# Patient Record
Sex: FEMALE | Race: OTHER | NOT HISPANIC OR LATINO | URBAN - METROPOLITAN AREA
[De-identification: names, ages, dates, MRNs, and addresses within clinical notes are randomized per-mention and may not be internally consistent; named-entity substitution may affect disease eponyms.]

---

## 2019-04-11 ENCOUNTER — EMERGENCY (EMERGENCY)
Facility: HOSPITAL | Age: 24
LOS: 1 days | Discharge: HOME | End: 2019-04-11
Attending: EMERGENCY MEDICINE | Admitting: EMERGENCY MEDICINE
Payer: COMMERCIAL

## 2019-04-11 VITALS
DIASTOLIC BLOOD PRESSURE: 78 MMHG | WEIGHT: 85.1 LBS | HEART RATE: 884 BPM | SYSTOLIC BLOOD PRESSURE: 117 MMHG | HEIGHT: 60 IN | RESPIRATION RATE: 18 BRPM | TEMPERATURE: 97 F | OXYGEN SATURATION: 100 %

## 2019-04-11 VITALS
TEMPERATURE: 98 F | SYSTOLIC BLOOD PRESSURE: 95 MMHG | DIASTOLIC BLOOD PRESSURE: 61 MMHG | OXYGEN SATURATION: 100 % | RESPIRATION RATE: 18 BRPM | HEART RATE: 70 BPM

## 2019-04-11 PROBLEM — Z00.00 ENCOUNTER FOR PREVENTIVE HEALTH EXAMINATION: Status: ACTIVE | Noted: 2019-04-11

## 2019-04-11 LAB
ALBUMIN SERPL ELPH-MCNC: 5.2 G/DL — HIGH (ref 3.3–5)
ALP SERPL-CCNC: 35 U/L — LOW (ref 40–120)
ALT FLD-CCNC: 13 U/L — SIGNIFICANT CHANGE UP (ref 10–45)
ANION GAP SERPL CALC-SCNC: 12 MMOL/L — SIGNIFICANT CHANGE UP (ref 5–17)
APPEARANCE UR: CLEAR — SIGNIFICANT CHANGE UP
APTT BLD: 29.3 SEC — SIGNIFICANT CHANGE UP (ref 27.5–36.3)
AST SERPL-CCNC: 18 U/L — SIGNIFICANT CHANGE UP (ref 10–40)
BASOPHILS # BLD AUTO: 0.04 K/UL — SIGNIFICANT CHANGE UP (ref 0–0.2)
BASOPHILS NFR BLD AUTO: 0.7 % — SIGNIFICANT CHANGE UP (ref 0–2)
BILIRUB SERPL-MCNC: 0.3 MG/DL — SIGNIFICANT CHANGE UP (ref 0.2–1.2)
BILIRUB UR-MCNC: NEGATIVE — SIGNIFICANT CHANGE UP
BLD GP AB SCN SERPL QL: NEGATIVE — SIGNIFICANT CHANGE UP
BUN SERPL-MCNC: 12 MG/DL — SIGNIFICANT CHANGE UP (ref 7–23)
CALCIUM SERPL-MCNC: 9.7 MG/DL — SIGNIFICANT CHANGE UP (ref 8.4–10.5)
CHLORIDE SERPL-SCNC: 99 MMOL/L — SIGNIFICANT CHANGE UP (ref 96–108)
CO2 SERPL-SCNC: 23 MMOL/L — SIGNIFICANT CHANGE UP (ref 22–31)
COLOR SPEC: YELLOW — SIGNIFICANT CHANGE UP
CREAT SERPL-MCNC: 0.7 MG/DL — SIGNIFICANT CHANGE UP (ref 0.5–1.3)
DIFF PNL FLD: ABNORMAL
EOSINOPHIL # BLD AUTO: 0.09 K/UL — SIGNIFICANT CHANGE UP (ref 0–0.5)
EOSINOPHIL NFR BLD AUTO: 1.5 % — SIGNIFICANT CHANGE UP (ref 0–6)
GLUCOSE SERPL-MCNC: 99 MG/DL — SIGNIFICANT CHANGE UP (ref 70–99)
GLUCOSE UR QL: NEGATIVE — SIGNIFICANT CHANGE UP
HCG SERPL-ACNC: 37 MIU/ML — HIGH
HCG UR QL: POSITIVE — SIGNIFICANT CHANGE UP
HCT VFR BLD CALC: 38.6 % — SIGNIFICANT CHANGE UP (ref 34.5–45)
HGB BLD-MCNC: 12.9 G/DL — SIGNIFICANT CHANGE UP (ref 11.5–15.5)
IMM GRANULOCYTES NFR BLD AUTO: 0.2 % — SIGNIFICANT CHANGE UP (ref 0–1.5)
INR BLD: 1.03 — SIGNIFICANT CHANGE UP (ref 0.88–1.16)
KETONES UR-MCNC: NEGATIVE — SIGNIFICANT CHANGE UP
LEUKOCYTE ESTERASE UR-ACNC: NEGATIVE — SIGNIFICANT CHANGE UP
LIDOCAIN IGE QN: 40 U/L — SIGNIFICANT CHANGE UP (ref 7–60)
LYMPHOCYTES # BLD AUTO: 2.5 K/UL — SIGNIFICANT CHANGE UP (ref 1–3.3)
LYMPHOCYTES # BLD AUTO: 42.7 % — SIGNIFICANT CHANGE UP (ref 13–44)
MAGNESIUM SERPL-MCNC: 2.2 MG/DL — SIGNIFICANT CHANGE UP (ref 1.6–2.6)
MCHC RBC-ENTMCNC: 30.9 PG — SIGNIFICANT CHANGE UP (ref 27–34)
MCHC RBC-ENTMCNC: 33.4 GM/DL — SIGNIFICANT CHANGE UP (ref 32–36)
MCV RBC AUTO: 92.3 FL — SIGNIFICANT CHANGE UP (ref 80–100)
MONOCYTES # BLD AUTO: 0.51 K/UL — SIGNIFICANT CHANGE UP (ref 0–0.9)
MONOCYTES NFR BLD AUTO: 8.7 % — SIGNIFICANT CHANGE UP (ref 2–14)
NEUTROPHILS # BLD AUTO: 2.71 K/UL — SIGNIFICANT CHANGE UP (ref 1.8–7.4)
NEUTROPHILS NFR BLD AUTO: 46.2 % — SIGNIFICANT CHANGE UP (ref 43–77)
NITRITE UR-MCNC: NEGATIVE — SIGNIFICANT CHANGE UP
NRBC # BLD: 0 /100 WBCS — SIGNIFICANT CHANGE UP (ref 0–0)
PH UR: 6 — SIGNIFICANT CHANGE UP (ref 5–8)
PLATELET # BLD AUTO: 295 K/UL — SIGNIFICANT CHANGE UP (ref 150–400)
POTASSIUM SERPL-MCNC: 3.9 MMOL/L — SIGNIFICANT CHANGE UP (ref 3.5–5.3)
POTASSIUM SERPL-SCNC: 3.9 MMOL/L — SIGNIFICANT CHANGE UP (ref 3.5–5.3)
PROT SERPL-MCNC: 7.7 G/DL — SIGNIFICANT CHANGE UP (ref 6–8.3)
PROT UR-MCNC: NEGATIVE MG/DL — SIGNIFICANT CHANGE UP
PROTHROM AB SERPL-ACNC: 11.7 SEC — SIGNIFICANT CHANGE UP (ref 10–12.9)
RBC # BLD: 4.18 M/UL — SIGNIFICANT CHANGE UP (ref 3.8–5.2)
RBC # FLD: 12.1 % — SIGNIFICANT CHANGE UP (ref 10.3–14.5)
RH IG SCN BLD-IMP: POSITIVE — SIGNIFICANT CHANGE UP
SODIUM SERPL-SCNC: 134 MMOL/L — LOW (ref 135–145)
SP GR SPEC: <=1.005 — SIGNIFICANT CHANGE UP (ref 1–1.03)
UROBILINOGEN FLD QL: 0.2 E.U./DL — SIGNIFICANT CHANGE UP
WBC # BLD: 5.86 K/UL — SIGNIFICANT CHANGE UP (ref 3.8–10.5)
WBC # FLD AUTO: 5.86 K/UL — SIGNIFICANT CHANGE UP (ref 3.8–10.5)

## 2019-04-11 PROCEDURE — 76817 TRANSVAGINAL US OBSTETRIC: CPT

## 2019-04-11 PROCEDURE — 85025 COMPLETE CBC W/AUTO DIFF WBC: CPT

## 2019-04-11 PROCEDURE — 86900 BLOOD TYPING SEROLOGIC ABO: CPT

## 2019-04-11 PROCEDURE — 86901 BLOOD TYPING SEROLOGIC RH(D): CPT

## 2019-04-11 PROCEDURE — 85730 THROMBOPLASTIN TIME PARTIAL: CPT

## 2019-04-11 PROCEDURE — 99284 EMERGENCY DEPT VISIT MOD MDM: CPT | Mod: 25

## 2019-04-11 PROCEDURE — 84702 CHORIONIC GONADOTROPIN TEST: CPT

## 2019-04-11 PROCEDURE — 86850 RBC ANTIBODY SCREEN: CPT

## 2019-04-11 PROCEDURE — 36415 COLL VENOUS BLD VENIPUNCTURE: CPT

## 2019-04-11 PROCEDURE — 83735 ASSAY OF MAGNESIUM: CPT

## 2019-04-11 PROCEDURE — 85610 PROTHROMBIN TIME: CPT

## 2019-04-11 PROCEDURE — 81001 URINALYSIS AUTO W/SCOPE: CPT

## 2019-04-11 PROCEDURE — 99284 EMERGENCY DEPT VISIT MOD MDM: CPT

## 2019-04-11 PROCEDURE — 76817 TRANSVAGINAL US OBSTETRIC: CPT | Mod: 26

## 2019-04-11 PROCEDURE — 83690 ASSAY OF LIPASE: CPT

## 2019-04-11 PROCEDURE — 80053 COMPREHEN METABOLIC PANEL: CPT

## 2019-04-11 PROCEDURE — 81025 URINE PREGNANCY TEST: CPT

## 2019-04-11 RX ORDER — SODIUM CHLORIDE 9 MG/ML
1000 INJECTION INTRAMUSCULAR; INTRAVENOUS; SUBCUTANEOUS ONCE
Qty: 0 | Refills: 0 | Status: COMPLETED | OUTPATIENT
Start: 2019-04-11 | End: 2019-04-11

## 2019-04-11 RX ADMIN — SODIUM CHLORIDE 1000 MILLILITER(S): 9 INJECTION INTRAMUSCULAR; INTRAVENOUS; SUBCUTANEOUS at 01:47

## 2019-04-11 NOTE — ED PROVIDER NOTE - CARE PLAN
Principal Discharge DX:	Pregnancy complication Principal Discharge DX:	Pregnancy complication  Secondary Diagnosis:	Threatened

## 2019-04-11 NOTE — ED ADULT NURSE NOTE - NSIMPLEMENTINTERV_GEN_ALL_ED
Implemented All Universal Safety Interventions:  [place] to call system. Call bell, personal items and telephone within reach. Instruct patient to call for assistance. Room bathroom lighting operational. Non-slip footwear when patient is off stretcher. Physically safe environment: no spills, clutter or unnecessary equipment. Stretcher in lowest position, wheels locked, appropriate side rails in place.

## 2019-04-11 NOTE — ED ADULT NURSE REASSESSMENT NOTE - NS ED NURSE REASSESS COMMENT FT1
Covering for Rn on break, Chidi RN (female) present as well as patients spouse during pelvic exam w/ LIMA Chaudhary.

## 2019-04-11 NOTE — ED PROVIDER NOTE - OBJECTIVE STATEMENT
LMP 1 month ago ~ 3/6 with + " light" line pregnancy at home  with current pregnancy -> states several days light bloody / brownish d/c and thus to ED for care Denies abd/ pelvic pain

## 2019-04-11 NOTE — ED PROVIDER NOTE - PHYSICAL EXAMINATION
pelvic notes os closed no lesions no FB Scant dried blood in vault no active bleeding No CMT or adnexal  tenderness

## 2019-04-11 NOTE — ED PROVIDER NOTE - CLINICAL SUMMARY MEDICAL DECISION MAKING FREE TEXT BOX
discussed all results finding and gyn follow up plan for this early pregnancy finding discussed all results finding and gyn follow up plan for this early pregnancy finding of low beta Hcg ( RH + ) -> follow up this weekend for repeat Beta HCG

## 2019-04-11 NOTE — ED PROVIDER NOTE - ATTENDING CONTRIBUTION TO CARE
23F , LMP 1 month ago.  states some blood/brown discharge over past few days.  no abd pain. no fevers. no vomiting.  gen- nad  heent- ncat, clear conj  cv -rrr  lungs -ctab  abd - soft, nt, nd  ext -wwp, no edema  neuro -aox3, steady gait, santa  no IUP seen on US, however very low HCG, recommend GYN f/u

## 2019-04-11 NOTE — ED ADULT TRIAGE NOTE - CHIEF COMPLAINT QUOTE
possible 3 weeks pregnant with vaginal bleeding started with light brown stain Saturday last week, now with clots and red color LMP 03/06/19, this is first pregnancy of the pt.

## 2019-04-15 DIAGNOSIS — O20.0 THREATENED ABORTION: ICD-10-CM

## 2019-04-15 DIAGNOSIS — Z3A.01 LESS THAN 8 WEEKS GESTATION OF PREGNANCY: ICD-10-CM

## 2019-04-15 DIAGNOSIS — N89.8 OTHER SPECIFIED NONINFLAMMATORY DISORDERS OF VAGINA: ICD-10-CM

## 2019-04-15 DIAGNOSIS — O20.9 HEMORRHAGE IN EARLY PREGNANCY, UNSPECIFIED: ICD-10-CM

## 2019-06-16 ENCOUNTER — EMERGENCY (EMERGENCY)
Facility: HOSPITAL | Age: 24
LOS: 1 days | Discharge: HOME | End: 2019-06-16
Admitting: EMERGENCY MEDICINE
Payer: COMMERCIAL

## 2019-06-16 VITALS
SYSTOLIC BLOOD PRESSURE: 103 MMHG | TEMPERATURE: 98 F | HEART RATE: 96 BPM | RESPIRATION RATE: 18 BRPM | OXYGEN SATURATION: 99 % | DIASTOLIC BLOOD PRESSURE: 67 MMHG

## 2019-06-16 PROCEDURE — 99284 EMERGENCY DEPT VISIT MOD MDM: CPT | Mod: 25

## 2019-06-16 PROCEDURE — 73562 X-RAY EXAM OF KNEE 3: CPT

## 2019-06-16 PROCEDURE — 27530 TREAT KNEE FRACTURE: CPT | Mod: RT

## 2019-06-16 PROCEDURE — 73562 X-RAY EXAM OF KNEE 3: CPT | Mod: 26

## 2019-06-16 PROCEDURE — 73700 CT LOWER EXTREMITY W/O DYE: CPT

## 2019-06-16 PROCEDURE — 73700 CT LOWER EXTREMITY W/O DYE: CPT | Mod: 26,RT

## 2019-06-16 PROCEDURE — 73562 X-RAY EXAM OF KNEE 3: CPT | Mod: 26,RT

## 2019-06-16 RX ORDER — IBUPROFEN 200 MG
600 TABLET ORAL ONCE
Refills: 0 | Status: COMPLETED | OUTPATIENT
Start: 2019-06-16 | End: 2019-06-16

## 2019-06-16 RX ADMIN — Medication 600 MILLIGRAM(S): at 21:30

## 2019-06-16 NOTE — CONSULT NOTE ADULT - SUBJECTIVE AND OBJECTIVE BOX
Orthopaedic Surgery Consult Note    For Surgeon: Kapil     HPI:  23yFemale otherwise healthy with R knee pain after direct medial collision during soccer agme. Pt unable to bear weight after, c/o of medial knee pain. No head trauma or LOC. No other injury. Denies numbness or tingling. No [facility].   Patient is a 23y old  Female who presents with a chief complaint of   HPI:  24 yo F with no pmh c/o R knee pain after getting hit while playing soccer. Pt collided with another played and was hit on the medial aspect of her R knee. Pt then fell to the ground. Pt unable to bear weight since. Pain worse with trying to straighten her knee. Denies hearing a pop. Admits to swelling. Denies numbness, tingling.    Allergies    No Known Allergies    Intolerances      PAST MEDICAL & SURGICAL HISTORY:    MEDICATIONS  (STANDING):    MEDICATIONS  (PRN):      Vital Signs Last 24 Hrs  T(C): 36.7 (16 Jun 2019 21:13), Max: 36.7 (16 Jun 2019 21:13)  T(F): 98 (16 Jun 2019 21:13), Max: 98 (16 Jun 2019 21:13)  HR: 96 (16 Jun 2019 21:13) (96 - 96)  BP: 103/67 (16 Jun 2019 21:13) (103/67 - 103/67)  BP(mean): --  RR: 18 (16 Jun 2019 21:13) (18 - 18)  SpO2: 99% (16 Jun 2019 21:13) (99% - 99%)    Physical Exam:    General: Pt Alert and oriented, NAD  R knee +effusion, no open wounds or ecchymosis  Pulses: 2+ DP/PT   Sensation: SILT s/s/sp/dp/t  Motor: EHL/FHL/TA/GS   Pt able to range knee 0 - 90 with mild difficulty 2/2 to effusion  Tenderness with varus stress on medial side  Mild medial joint line tenderness  Unable to perform McMurrays or Pivot shift 2/2 to pain  1+ Lachmans compared to contralateral side  Compartments soft and compressible        Imaging:   XR/CT: minimally displaced medial tibial plateua fracture     A/P: 23yFemale w minimally displaced medial tibial plateua fracture, concerning for concomittant soft tissue injury, r/o MCL, PCL, ACL. Compartments soft and compressible.     No acute orthopedic intervention  Pt placed in ACE compression, Knee immbilizer and crutches  NWB RLE   Encouraged rest, elevation, ice, compression   Discussed signs and symptoms of compartment syndrome and if evident, need to return to ED. Pt verbalized undestanding   Follow up with Dr. Dick this week for further followup and likely MRI   Discussed with Dr. Dick

## 2019-06-16 NOTE — ED ADULT NURSE NOTE - NSIMPLEMENTINTERV_GEN_ALL_ED
Implemented All Universal Safety Interventions:  [place] to call system. Call bell, personal items and telephone within reach. Instruct patient to call for assistance. Room bathroom lighting operational. Non-slip footwear when patient is off stretcher. Physically safe environment: no spills, clutter or unnecessary equipment. Stretcher in lowest position, wheels locked, appropriate side rails in place.

## 2019-06-16 NOTE — ED PROVIDER NOTE - NSFOLLOWUPINSTRUCTIONS_ED_ALL_ED_FT
You were evaluated in the ED for knee pain. You had a Xray and CT of the knee showing a medial tibial plateau fracture. You were evaluated by orthopedics. You were placed in a knee immobilizer and given crutches. DO NOT BEAR ANY WEIGHT ON THE AFFECTED LEG. USE THE CRUTCHES AT ALL TIMES. IF YOU DEVELOP SWELLING OF THE LOWER LEG, BLUE OR PALE DISCOLORATION OF THE LEG / FOOT, NUMBNESS /TINGLING OF THE TOES, OR INABILITY TO MOVE THEM, RETURN IMMEDIATELY TO THE ED. OTHERWISE, FOLLOW UP WITH ORTHOPEDIST DR EDGAR THIS WEEK. YOU WILL LIKELY NEED SURGERY.     Nondisplaced Tibial Plateau Fracture  Image   A tibial plateau fracture is a break in the top of the shin bone (tibia). The top of the tibia has a flat, smooth surface (tibial plateau). This part of the tibia is softer than the rest of the bone. It forms the bottom of the knee joint. If a strong force shoves the thigh bone (femur) down and onto the tibial plateau, the tibial plateau can collapse or break apart at the edges. This may also be called an intra-articular fracture.    A nondisplaced fracture means that the broken pieces of bone have not moved out of their normal position. This type of fracture can usually be treated without surgery.    What are the causes?  Common causes of this type of fracture include:  Car accidents.  Jumps or falls from a significant height.  Injuries from activities that put a lot of force on the knee, such as injuries from skiing, mountain biking, or contact sports.  What increases the risk?  You may be at higher risk for this type of fracture if:  You play sports that put a lot of force on your knee, including contact sports.  You have a history of bone infections.  You are an older person with a condition that causes weak bones (osteoporosis).  What are the signs or symptoms?  Symptoms of a nondisplaced tibial plateau fracture begin right after the injury. They may include:  Pain that gets worse when putting weight on your knee or moving your knee.  Knee swelling and bruising.  How is this diagnosed?  This condition may be diagnosed based on:  Your symptoms and medical history. Your health care provider may ask about recent knee or leg injuries you have had.  A physical exam.  Imaging tests, such as X-rays, CT scan, or MRI.  How is this treated?  This condition is treated by wearing a brace on your leg. You will not be able to put any of your body weight on the leg (weight-bearing restrictions). You may be given crutches, a scooter, a walker, or a wheelchair to help you move around. Treatment may also involve:  Prescription pain medicine.  Physical therapy.  Follow these instructions at home:  Medicines     Take over-the-counter and prescription medicines only as told by your health care provider.  Do not drive or use heavy machinery while taking prescription pain medicine.  If you are taking prescription pain medicine, take actions to prevent or treat constipation. Your health care provider may recommend that you:  Drink enough fluid to keep your urine pale yellow.  Eat foods that are high in fiber, such as fresh fruits and vegetables, whole grains, and beans.  Limit foods that are high in fat and processed sugars, such as fried or sweet foods.  Take an over-the-counter or prescription medicine for constipation.  If you have a brace:     Wear the brace as told by your health care provider. Remove it only as told by your health care provider.  Loosen the brace if your toes tingle, become numb, or turn cold and blue.  Keep the brace clean.  If the brace is not waterproof:  Do not let it get wet.   Cover it with a watertight covering when you take a bath or a shower.  Activity     Do not use your leg to support your body weight until your health care provider says that you can. Follow weight-bearing restrictions.  Use crutches, a cane, or a walker as directed.  Ask your health care provider what activities are safe for you and what activities you need to avoid.  Do physical therapy exercises as directed.  Do not drive while wearing a brace on the leg that you use for driving.  Managing pain, stiffness, and swelling     Image   If directed, put ice on the injured area:  If you have a removable brace, remove it as told by your health care provider.  Put ice in a plastic bag.  Place a towel between your skin and the bag.  Leave the ice on for 20 minutes, 2–3 times a day.  Move your toes and ankle often to avoid stiffness and to lessen swelling.  Raise (elevate) the injured area above the level of your heart while you are sitting or lying down.  General instructions     Do not take baths, swim, or use a hot tub until your health care provider approves. Ask your health care provider if you may take showers. You may only be allowed to take sponge baths.  Do not use any products that contain nicotine or tobacco, such as cigarettes and e-cigarettes. These can delay bone healing. If you need help quitting, ask your health care provider.  Keep all follow-up visits as told by your health care provider. This is important.  Contact a health care provider if:  You have pain that does not get better with medicine.  Get help right away if:  You have severe pain or swelling.  You have new pain, swelling, or warmth in your lower leg.  Your toes or foot:  Are unusually cold.   Turn a bluish color.  Are numb.  You have chest pain.  You have difficulty breathing.  Summary  A tibial plateau fracture is a break in the top of the shin bone (tibia), which forms the bottom of the knee joint.  A nondisplaced fracture means that the broken pieces of bone have not moved out of their normal position.  Do not use your leg to support your body weight until your health care provider says that you can. Follow weight-bearing restrictions.  Keep all follow-up visits as told by your health care provider. This is important.  This information is not intended to replace advice given to you by your health care provider. Make sure you discuss any questions you have with your health care provider.    Knee Immobilizer    WHAT YOU NEED TO KNOW:    A knee immobilizer limits knee movement. It is used after an injury or surgery to help your knee, muscles, or tendons heal.     DISCHARGE INSTRUCTIONS:    How to safely use a knee immobilizer:     Have your knee immobilizer fitted by your healthcare provider. It is important that your knee immobilizer is the right size for you and that it fits properly.       Wear your knee immobilizer as directed. It can be worn over your clothing. Check the fit of the knee immobilizer often. If it does not fit properly or slips out of place, it could cause further injury.       Use crutches as directed. You may need to avoid putting weight on your injured leg. Your healthcare provider will tell you if you need crutches and for how long.       Inspect your knee immobilizer often. Do not wear your knee immobilizer if it is damaged or broken. You may need to replace it if it becomes worn.       Ask your healthcare provider how to care for your knee immobilizer. You may be able to hand wash the fabric with mild soap and water. Do not place it in the washer or dryer.       Go to physical therapy as directed. A physical therapist can help you strengthen the muscles in your leg and help your knee heal.     Contact your healthcare provider if:     Your knee pain becomes worse when you wear your knee immobilizer.       Your skin is sore or raw after you wear your knee immobilizer.       Your leg feels numb or swells while you wear your knee immobilizer.       Your knee immobilizer is damaged.       You have questions or concerns about your condition or care.     Return to the emergency department if:     You have severe swelling or pain in your leg or knee.

## 2019-06-16 NOTE — ED PROVIDER NOTE - DIAGNOSTIC INTERPRETATION
ER PA: Otilia Gordon  knee xray INTERPRETATION:  +lucency on tibial plateau on lateral view and ?fracture of medial tibia on AP view; no soft tissue swelling noted; normal bony alignment.

## 2019-06-16 NOTE — ED PROVIDER NOTE - OBJECTIVE STATEMENT
24 yo F with no pmh c/o R knee pain after getting hit while playing soccer. Pt collided with another played and was hit on the medial aspect of her R knee. Pt then fell to the ground. Pt unable to bear weight since. Pain worse with trying to straighten her knee. Denies hearing a pop. Admits to swelling. Denies numbness, tingling.

## 2019-06-16 NOTE — ED PROVIDER NOTE - PROGRESS NOTE DETAILS
Pt seen by ortho for tibial plateau fx. Pt to be placed in knee immobilizer, NWB, crutches. Pt to f/u Dr CARL Dick this week at [facility]. Pt advised to watch for si / sx compartment syndrome

## 2019-06-16 NOTE — ED PROVIDER NOTE - CARE PROVIDER_API CALL
Benedict Dick)  Orthopaedic Surgery  [address], [address] [place] [address]  Phone: [phone]  Fax: [phone]  Follow Up Time:

## 2019-06-16 NOTE — ED PROVIDER NOTE - PHYSICAL EXAMINATION
CONSTITUTIONAL: Well-appearing; well-nourished; in no apparent distress.   HEAD: Normocephalic; atraumatic.   EYES: PERRL; EOM intact; conjunctiva and sclera clear  ENT: normal nose; no rhinorrhea; normal pharynx with no erythema or lesions.   NECK: Supple; non-tender;   CARDIOVASCULAR: Normal S1, S2; no murmurs, rubs, or gallops. Regular rate and rhythm.   RESPIRATORY: Breathing easily; breath sounds clear and equal bilaterally; no wheezes, rhonchi, or rales.  MSK: R knee- +swelling, +tenderness to medial joint line, limited extension due to pain; can extend to about 75 degrees, no laxity   EXT: No cyanosis or edema; N/V intact  SKIN: Normal for age and race; warm; dry; good turgor; no apparent lesions or rash. CONSTITUTIONAL: Well-appearing; well-nourished; in no apparent distress.   HEAD: Normocephalic; atraumatic.   EYES: PERRL; EOM intact; conjunctiva and sclera clear  ENT: normal nose; no rhinorrhea; normal pharynx with no erythema or lesions.   NECK: Supple; non-tender;   CARDIOVASCULAR: Normal S1, S2; no murmurs, rubs, or gallops. Regular rate and rhythm.   RESPIRATORY: Breathing easily; breath sounds clear and equal bilaterally; no wheezes, rhonchi, or rales.  MSK: R knee- +swelling, +tenderness to medial joint line and medial aspect of proximal tibia, limited extension due to pain; can extend to about 75 degrees, no laxity   EXT: No cyanosis or edema; N/V intact  SKIN: Normal for age and race; warm; dry; good turgor; no apparent lesions or rash.

## 2019-06-16 NOTE — ED PROVIDER NOTE - CLINICAL SUMMARY MEDICAL DECISION MAKING FREE TEXT BOX
24 yo F with no pmh c/o R knee pain after getting hit while playing soccer. Pt collided with another played and was hit on the medial aspect of her R knee. Pt then fell to the ground. Pt unable to bear weight since. Pain worse with trying to straighten her knee. Denies hearing a pop. Admits to swelling. Denies numbness, tingling. R knee- +swelling, +tenderness to medial joint line, limited extension due to pain; can extend to about 75 degrees, no laxity 24 yo F with no pmh c/o R knee pain after getting hit while playing soccer. Pt collided with another played and was hit on the medial aspect of her R knee. Pt then fell to the ground. Pt unable to bear weight since. Pain worse with trying to straighten her knee. Denies hearing a pop. Admits to swelling. Denies numbness, tingling. R knee- +swelling, +tenderness to medial joint line and medial aspect of proximal tibia, limited extension due to pain; can extend to about 75 degrees, no laxity

## 2019-06-17 ENCOUNTER — FORM ENCOUNTER (OUTPATIENT)
Age: 24
End: 2019-06-17

## 2019-06-17 ENCOUNTER — APPOINTMENT (OUTPATIENT)
Dept: ORTHOPEDIC SURGERY | Facility: CLINIC | Age: 24
End: 2019-06-17
Payer: COMMERCIAL

## 2019-06-17 VITALS
BODY MASS INDEX: 16.88 KG/M2 | WEIGHT: 86 LBS | TEMPERATURE: 98.2 F | RESPIRATION RATE: 16 BRPM | DIASTOLIC BLOOD PRESSURE: 62 MMHG | HEIGHT: 60 IN | OXYGEN SATURATION: 100 % | SYSTOLIC BLOOD PRESSURE: 97 MMHG | HEART RATE: 86 BPM

## 2019-06-17 DIAGNOSIS — M23.91 UNSPECIFIED INTERNAL DERANGEMENT OF RIGHT KNEE: ICD-10-CM

## 2019-06-17 PROCEDURE — 99204 OFFICE O/P NEW MOD 45 MIN: CPT | Mod: 25

## 2019-06-17 PROCEDURE — 20610 DRAIN/INJ JOINT/BURSA W/O US: CPT | Mod: RT

## 2019-06-17 RX ORDER — IBUPROFEN 600 MG/1
600 TABLET, FILM COATED ORAL
Refills: 0 | Status: ACTIVE | COMMUNITY

## 2019-06-17 RX ORDER — HYDROCODONE BITARTRATE AND ACETAMINOPHEN 5; 325 MG/1; MG/1
5-325 TABLET ORAL
Qty: 30 | Refills: 0 | Status: ACTIVE | COMMUNITY
Start: 2019-06-17 | End: 1900-01-01

## 2019-06-18 ENCOUNTER — APPOINTMENT (OUTPATIENT)
Dept: MRI IMAGING | Facility: CLINIC | Age: 24
End: 2019-06-18
Payer: COMMERCIAL

## 2019-06-18 ENCOUNTER — OUTPATIENT (OUTPATIENT)
Dept: OUTPATIENT SERVICES | Facility: HOSPITAL | Age: 24
LOS: 1 days | End: 2019-06-18

## 2019-06-18 PROCEDURE — 73721 MRI JNT OF LWR EXTRE W/O DYE: CPT | Mod: 26,RT

## 2019-06-19 ENCOUNTER — APPOINTMENT (OUTPATIENT)
Dept: ORTHOPEDIC SURGERY | Facility: CLINIC | Age: 24
End: 2019-06-19
Payer: COMMERCIAL

## 2019-06-19 VITALS — HEIGHT: 60 IN | BODY MASS INDEX: 16.88 KG/M2 | WEIGHT: 86 LBS

## 2019-06-19 PROCEDURE — 99024 POSTOP FOLLOW-UP VISIT: CPT
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
## 2019-06-20 DIAGNOSIS — S82.141A DISPLACED BICONDYLAR FRACTURE OF RIGHT TIBIA, INITIAL ENCOUNTER FOR CLOSED FRACTURE: ICD-10-CM

## 2019-06-20 DIAGNOSIS — M25.561 PAIN IN RIGHT KNEE: ICD-10-CM

## 2019-07-01 ENCOUNTER — APPOINTMENT (OUTPATIENT)
Dept: ORTHOPEDIC SURGERY | Facility: CLINIC | Age: 24
End: 2019-07-01
Payer: COMMERCIAL

## 2019-07-01 VITALS — RESPIRATION RATE: 16 BRPM | HEIGHT: 60 IN | WEIGHT: 86 LBS | BODY MASS INDEX: 16.88 KG/M2

## 2019-07-01 PROCEDURE — 99024 POSTOP FOLLOW-UP VISIT: CPT

## 2019-07-02 NOTE — DISCUSSION/SUMMARY
[de-identified] : Pt counseled re: findings.  Proceed w advanciong ROM unlocked 0-60 degrees today, will progress 20-30 degrees every 2 weeks.  Begin therapy.  Crutches modified for ambulation.\par \par Pt will be strict nonweightbearing right leg for minimum 6 weeks from injury and is advised against standing during that time, as such not recommended she attend school as of yet.  Can consider in 2 weeks.  Will be unable to stand/perform procedures until cleared.  Brace at all times.  Followup up in 2-4 weeks.\par \par Xrays at followup:  Right knee AP, lateral and obliques

## 2019-07-02 NOTE — HISTORY OF PRESENT ILLNESS
[de-identified] : Pt doing relatively well.  Swelling has improved.  medial calf pain and cramping, reports deformity to medial head of gastrocnemius.  Has progressed with crutch ambulation.  Following instructions with ROM brace locked at 30 degrees.  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

## 2019-07-02 NOTE — PHYSICAL EXAM
[de-identified] : Right Knee:\par \par Examination of the involved knee was performed inclusive of the following tests:\par \par Inspection: effusion,quadriceps atrophy, skin\par \par Gait: stride length, aliyah, heel strike\par \par Range of Motion: active and passive with comparison to contralateral knee\par \par Strength Testing: flexion and extention\par \par Tenderness Evaluation: medial and lateral joint line, medial and lateral patellar facet, quadriceps and patellar tendon, hamstring, pes anserinus\par \par Stability: varus and valgus at 0 and 30 degrees (1-3+), Lachman (1A unless noted), anterior drawer (1-3+), posterior drawer (1-3+), pivot-shift (normal, glide, shift)\par \par Meniscus: Terry, Thessaly\par \par Patellofemoral: patellar grind, patellar compression, tracking, J-sign, tilt, test, apprehension, medial and lateral translation (2 quadrants unless otherwise noted)\par \par Abnormal findings were as follows: \par Range of Motion: ROM today Was improved relative to last evaluation, 0° to 60° obtainable today with mild pain.\par Special test: grade 2 varus laxity at 0 and 30 degrees remains persistent but she does have an intact endpoint. able to tolerate Lachman today which demonstrates trace laxity but an intact endpoint. Tender palpation lateral and medial jt line, unable to perform AD/PD, uinable to toelrate dial. Stable to valgus sttress \par \par  [de-identified] : No new images.\par \par Xray: R knee xrays from ER demonstrate concern for anteromedial knee tibial plateau impaction fracture\par \par CT: Hospital CT scan right knee confirm anteromedial tibial plateau fracture and large effusion. Secondary congruency of medial plateau within acceptable limits\par \par MRI: Right knee MRI was reviewed with the patient. This demonstrates a lateral collateral ligament/posterolateral corner grade 2 injury. ACL is intact. PCL is intact. Medial and lateral meniscus are intact. Significant anteromedial tibial plateau fracture with minimal angulation displacement.   Posteromedial knee structure in posterior cyst consistetn with retracted hamstring rupture and/or partial medial gastrocnemius tear.\par \par

## 2019-07-15 ENCOUNTER — APPOINTMENT (OUTPATIENT)
Dept: ORTHOPEDIC SURGERY | Facility: CLINIC | Age: 24
End: 2019-07-15
Payer: COMMERCIAL

## 2019-07-15 ENCOUNTER — OUTPATIENT (OUTPATIENT)
Dept: OUTPATIENT SERVICES | Facility: HOSPITAL | Age: 24
LOS: 1 days | End: 2019-07-15
Payer: COMMERCIAL

## 2019-07-15 ENCOUNTER — APPOINTMENT (OUTPATIENT)
Dept: RADIOLOGY | Facility: CLINIC | Age: 24
End: 2019-07-15

## 2019-07-15 VITALS — HEIGHT: 60 IN | BODY MASS INDEX: 16.88 KG/M2 | WEIGHT: 86 LBS | RESPIRATION RATE: 16 BRPM

## 2019-07-15 DIAGNOSIS — S82.141A DISPLACED BICONDYLAR FRACTURE OF RIGHT TIBIA, INITIAL ENCOUNTER FOR CLOSED FRACTURE: ICD-10-CM

## 2019-07-15 DIAGNOSIS — S89.91XA UNSPECIFIED INJURY OF RIGHT LOWER LEG, INITIAL ENCOUNTER: ICD-10-CM

## 2019-07-15 PROCEDURE — 73562 X-RAY EXAM OF KNEE 3: CPT | Mod: RT

## 2019-07-15 PROCEDURE — 73564 X-RAY EXAM KNEE 4 OR MORE: CPT | Mod: 26,RT

## 2019-07-15 PROCEDURE — 99024 POSTOP FOLLOW-UP VISIT: CPT

## 2019-07-15 PROCEDURE — 73564 X-RAY EXAM KNEE 4 OR MORE: CPT

## 2019-07-28 ENCOUNTER — FORM ENCOUNTER (OUTPATIENT)
Age: 24
End: 2019-07-28

## 2019-07-29 ENCOUNTER — OUTPATIENT (OUTPATIENT)
Dept: OUTPATIENT SERVICES | Facility: HOSPITAL | Age: 24
LOS: 1 days | End: 2019-07-29
Payer: COMMERCIAL

## 2019-07-29 ENCOUNTER — APPOINTMENT (OUTPATIENT)
Dept: ORTHOPEDIC SURGERY | Facility: CLINIC | Age: 24
End: 2019-07-29
Payer: COMMERCIAL

## 2019-07-29 ENCOUNTER — APPOINTMENT (OUTPATIENT)
Dept: RADIOLOGY | Facility: CLINIC | Age: 24
End: 2019-07-29

## 2019-07-29 VITALS — BODY MASS INDEX: 17.28 KG/M2 | RESPIRATION RATE: 16 BRPM | HEIGHT: 60 IN | WEIGHT: 88 LBS

## 2019-07-29 PROCEDURE — 73560 X-RAY EXAM OF KNEE 1 OR 2: CPT

## 2019-07-29 PROCEDURE — 99024 POSTOP FOLLOW-UP VISIT: CPT

## 2019-07-29 PROCEDURE — 73560 X-RAY EXAM OF KNEE 1 OR 2: CPT | Mod: 26,RT

## 2021-01-11 ENCOUNTER — TRANSCRIPTION ENCOUNTER (OUTPATIENT)
Age: 26
End: 2021-01-11

## 2022-04-11 ENCOUNTER — EMERGENCY (EMERGENCY)
Facility: HOSPITAL | Age: 27
LOS: 1 days | Discharge: HOME | End: 2022-04-11
Attending: EMERGENCY MEDICINE | Admitting: EMERGENCY MEDICINE
Payer: COMMERCIAL

## 2022-04-11 VITALS
RESPIRATION RATE: 16 BRPM | TEMPERATURE: 98 F | HEART RATE: 95 BPM | OXYGEN SATURATION: 100 % | DIASTOLIC BLOOD PRESSURE: 59 MMHG | SYSTOLIC BLOOD PRESSURE: 100 MMHG

## 2022-04-11 VITALS
TEMPERATURE: 98 F | HEIGHT: 60 IN | SYSTOLIC BLOOD PRESSURE: 103 MMHG | OXYGEN SATURATION: 100 % | DIASTOLIC BLOOD PRESSURE: 45 MMHG | HEART RATE: 99 BPM | RESPIRATION RATE: 16 BRPM

## 2022-04-11 LAB
ALBUMIN SERPL ELPH-MCNC: 4.8 G/DL — SIGNIFICANT CHANGE UP (ref 3.3–5)
ALP SERPL-CCNC: 56 U/L — SIGNIFICANT CHANGE UP (ref 40–120)
ALT FLD-CCNC: 19 U/L — SIGNIFICANT CHANGE UP (ref 4–33)
ANION GAP SERPL CALC-SCNC: 22 MMOL/L — HIGH (ref 7–14)
APPEARANCE UR: CLEAR — SIGNIFICANT CHANGE UP
AST SERPL-CCNC: 28 U/L — SIGNIFICANT CHANGE UP (ref 4–32)
BACTERIA # UR AUTO: NEGATIVE — SIGNIFICANT CHANGE UP
BASE EXCESS BLDV CALC-SCNC: -1.9 MMOL/L — SIGNIFICANT CHANGE UP (ref -2–3)
BASOPHILS # BLD AUTO: 0.03 K/UL — SIGNIFICANT CHANGE UP (ref 0–0.2)
BASOPHILS NFR BLD AUTO: 0.4 % — SIGNIFICANT CHANGE UP (ref 0–2)
BILIRUB SERPL-MCNC: 0.6 MG/DL — SIGNIFICANT CHANGE UP (ref 0.2–1.2)
BILIRUB UR-MCNC: NEGATIVE — SIGNIFICANT CHANGE UP
BLOOD GAS VENOUS COMPREHENSIVE RESULT: SIGNIFICANT CHANGE UP
BUN SERPL-MCNC: 17 MG/DL — SIGNIFICANT CHANGE UP (ref 7–23)
CALCIUM SERPL-MCNC: 9.5 MG/DL — SIGNIFICANT CHANGE UP (ref 8.4–10.5)
CHLORIDE BLDV-SCNC: 105 MMOL/L — SIGNIFICANT CHANGE UP (ref 96–108)
CHLORIDE SERPL-SCNC: 103 MMOL/L — SIGNIFICANT CHANGE UP (ref 98–107)
CO2 BLDV-SCNC: 26.7 MMOL/L — HIGH (ref 22–26)
CO2 SERPL-SCNC: 15 MMOL/L — LOW (ref 22–31)
COLOR SPEC: YELLOW — SIGNIFICANT CHANGE UP
CREAT SERPL-MCNC: 0.64 MG/DL — SIGNIFICANT CHANGE UP (ref 0.5–1.3)
DIFF PNL FLD: NEGATIVE — SIGNIFICANT CHANGE UP
EGFR: 123 ML/MIN/1.73M2 — SIGNIFICANT CHANGE UP
EOSINOPHIL # BLD AUTO: 0.01 K/UL — SIGNIFICANT CHANGE UP (ref 0–0.5)
EOSINOPHIL NFR BLD AUTO: 0.1 % — SIGNIFICANT CHANGE UP (ref 0–6)
EPI CELLS # UR: 2 /HPF — SIGNIFICANT CHANGE UP (ref 0–5)
GAS PNL BLDV: 135 MMOL/L — LOW (ref 136–145)
GLUCOSE BLDV-MCNC: 72 MG/DL — SIGNIFICANT CHANGE UP (ref 70–99)
GLUCOSE SERPL-MCNC: 80 MG/DL — SIGNIFICANT CHANGE UP (ref 70–99)
GLUCOSE UR QL: NEGATIVE — SIGNIFICANT CHANGE UP
HCG SERPL-ACNC: <5 MIU/ML — SIGNIFICANT CHANGE UP
HCO3 BLDV-SCNC: 25 MMOL/L — SIGNIFICANT CHANGE UP (ref 22–29)
HCT VFR BLD CALC: 39.7 % — SIGNIFICANT CHANGE UP (ref 34.5–45)
HCT VFR BLDA CALC: 39 % — SIGNIFICANT CHANGE UP (ref 34.5–46.5)
HGB BLD CALC-MCNC: 12.9 G/DL — SIGNIFICANT CHANGE UP (ref 11.5–15.5)
HGB BLD-MCNC: 13.3 G/DL — SIGNIFICANT CHANGE UP (ref 11.5–15.5)
IANC: 6.98 K/UL — SIGNIFICANT CHANGE UP (ref 1.8–7.4)
IMM GRANULOCYTES NFR BLD AUTO: 0.4 % — SIGNIFICANT CHANGE UP (ref 0–1.5)
KETONES UR-MCNC: ABNORMAL
LACTATE BLDV-MCNC: 1.7 MMOL/L — SIGNIFICANT CHANGE UP (ref 0.5–2)
LEUKOCYTE ESTERASE UR-ACNC: NEGATIVE — SIGNIFICANT CHANGE UP
LIDOCAIN IGE QN: 20 U/L — SIGNIFICANT CHANGE UP (ref 7–60)
LYMPHOCYTES # BLD AUTO: 0.3 K/UL — LOW (ref 1–3.3)
LYMPHOCYTES # BLD AUTO: 3.9 % — LOW (ref 13–44)
MCHC RBC-ENTMCNC: 29.7 PG — SIGNIFICANT CHANGE UP (ref 27–34)
MCHC RBC-ENTMCNC: 33.5 GM/DL — SIGNIFICANT CHANGE UP (ref 32–36)
MCV RBC AUTO: 88.6 FL — SIGNIFICANT CHANGE UP (ref 80–100)
MONOCYTES # BLD AUTO: 0.25 K/UL — SIGNIFICANT CHANGE UP (ref 0–0.9)
MONOCYTES NFR BLD AUTO: 3.3 % — SIGNIFICANT CHANGE UP (ref 2–14)
NEUTROPHILS # BLD AUTO: 6.98 K/UL — SIGNIFICANT CHANGE UP (ref 1.8–7.4)
NEUTROPHILS NFR BLD AUTO: 91.9 % — HIGH (ref 43–77)
NITRITE UR-MCNC: NEGATIVE — SIGNIFICANT CHANGE UP
NRBC # BLD: 0 /100 WBCS — SIGNIFICANT CHANGE UP
NRBC # FLD: 0.02 K/UL — HIGH
PCO2 BLDV: 51 MMHG — HIGH (ref 39–42)
PH BLDV: 7.3 — LOW (ref 7.32–7.43)
PH UR: 6.5 — SIGNIFICANT CHANGE UP (ref 5–8)
PLATELET # BLD AUTO: 196 K/UL — SIGNIFICANT CHANGE UP (ref 150–400)
PO2 BLDV: 36 MMHG — SIGNIFICANT CHANGE UP
POTASSIUM BLDV-SCNC: 3.7 MMOL/L — SIGNIFICANT CHANGE UP (ref 3.5–5.1)
POTASSIUM SERPL-MCNC: 4.3 MMOL/L — SIGNIFICANT CHANGE UP (ref 3.5–5.3)
POTASSIUM SERPL-SCNC: 4.3 MMOL/L — SIGNIFICANT CHANGE UP (ref 3.5–5.3)
PROT SERPL-MCNC: 7.1 G/DL — SIGNIFICANT CHANGE UP (ref 6–8.3)
PROT UR-MCNC: ABNORMAL
RBC # BLD: 4.48 M/UL — SIGNIFICANT CHANGE UP (ref 3.8–5.2)
RBC # FLD: 12.6 % — SIGNIFICANT CHANGE UP (ref 10.3–14.5)
RBC CASTS # UR COMP ASSIST: 2 /HPF — SIGNIFICANT CHANGE UP (ref 0–4)
SAO2 % BLDV: 60.7 % — SIGNIFICANT CHANGE UP
SODIUM SERPL-SCNC: 140 MMOL/L — SIGNIFICANT CHANGE UP (ref 135–145)
SP GR SPEC: 1.03 — SIGNIFICANT CHANGE UP (ref 1–1.05)
UROBILINOGEN FLD QL: SIGNIFICANT CHANGE UP
WBC # BLD: 7.6 K/UL — SIGNIFICANT CHANGE UP (ref 3.8–10.5)
WBC # FLD AUTO: 7.6 K/UL — SIGNIFICANT CHANGE UP (ref 3.8–10.5)
WBC UR QL: 2 /HPF — SIGNIFICANT CHANGE UP (ref 0–5)

## 2022-04-11 PROCEDURE — 99284 EMERGENCY DEPT VISIT MOD MDM: CPT

## 2022-04-11 RX ORDER — ONDANSETRON 8 MG/1
1 TABLET, FILM COATED ORAL
Qty: 30 | Refills: 0
Start: 2022-04-11 | End: 2022-04-20

## 2022-04-11 RX ORDER — ONDANSETRON 8 MG/1
4 TABLET, FILM COATED ORAL ONCE
Refills: 0 | Status: COMPLETED | OUTPATIENT
Start: 2022-04-11 | End: 2022-04-11

## 2022-04-11 RX ORDER — SODIUM CHLORIDE 9 MG/ML
2000 INJECTION INTRAMUSCULAR; INTRAVENOUS; SUBCUTANEOUS ONCE
Refills: 0 | Status: COMPLETED | OUTPATIENT
Start: 2022-04-11 | End: 2022-04-11

## 2022-04-11 RX ORDER — FAMOTIDINE 10 MG/ML
20 INJECTION INTRAVENOUS ONCE
Refills: 0 | Status: COMPLETED | OUTPATIENT
Start: 2022-04-11 | End: 2022-04-11

## 2022-04-11 RX ORDER — METOCLOPRAMIDE HCL 10 MG
10 TABLET ORAL ONCE
Refills: 0 | Status: COMPLETED | OUTPATIENT
Start: 2022-04-11 | End: 2022-04-11

## 2022-04-11 RX ORDER — FAMOTIDINE 10 MG/ML
20 INJECTION INTRAVENOUS ONCE
Refills: 0 | Status: DISCONTINUED | OUTPATIENT
Start: 2022-04-11 | End: 2022-04-11

## 2022-04-11 RX ADMIN — Medication 10 MILLIGRAM(S): at 16:53

## 2022-04-11 RX ADMIN — Medication 30 MILLILITER(S): at 19:40

## 2022-04-11 RX ADMIN — ONDANSETRON 4 MILLIGRAM(S): 8 TABLET, FILM COATED ORAL at 19:41

## 2022-04-11 RX ADMIN — FAMOTIDINE 20 MILLIGRAM(S): 10 INJECTION INTRAVENOUS at 17:08

## 2022-04-11 RX ADMIN — SODIUM CHLORIDE 2000 MILLILITER(S): 9 INJECTION INTRAMUSCULAR; INTRAVENOUS; SUBCUTANEOUS at 16:53

## 2022-04-11 NOTE — ED PROVIDER NOTE - PROGRESS NOTE DETAILS
LIMA Prince: Labs reviewed, no electrolytes imbalance. Pt reassessed, states she feels better. abdomen non-acute. stable for dc home. PMD follow up. Strict return precautions.

## 2022-04-11 NOTE — ED ADULT NURSE NOTE - OBJECTIVE STATEMENT
Patient is a 26-year-old female, A&OX3, ambulatory no pertinent medical Hx c/o vomiting X 24 hours. Pt states her 2 yr old has the stomach virus and she has same signs& symptoms. Denies abdominal discomfort. Unable to tolerate PO intake. 20 G to R AC. Labs sent. Will continue to monitor.

## 2022-04-11 NOTE — ED PROVIDER NOTE - ATTENDING CONTRIBUTION TO CARE
GEN - NAD; well appearing; A+O x3   HEAD - NC/AT   EYES- PERRL, EOMI  ENT: Airway patent, dry mm, Oral cavity and pharynx normal. No inflammation, swelling, exudate, or lesions.    NECK: Neck supple, hy, no masses.  PULMONARY - CTABL, symmetric breath sounds.   CARDIAC -s1s2, RRR, no M,G,R  ABDOMEN - +BS, ND, NT, soft, no guarding, no rebound, no masses   BACK - no CVA tenderness  EXTREMITIES - FROM, no edema   SKIN - +nonblanching small patch of erythema to mid abd, nontender, no other rash, no crepitance  NEUROLOGIC - alert, speech clear, no focal deficits  PSYCH -nl mood/affect, nl insight.  a/p-26f presents to the ed for nausea and multiple episodes vomiting since early this morning, daughter was sick with same symptoms starting 2 d ago and  started with symptoms today. No fevers, cough, cp, sob, diarrhea, dysuria.  ON exam appears well-vss, ao3, unlabored breathing, dry mm, abd soft/nontender-given symptoms present in all family member-suspect likely viral infection-plan for labs, hydration, symptomatic treatment-eval for elec disturbance, organ dysfunction, reass if can trial po once labs return.

## 2022-04-11 NOTE — ED PROVIDER NOTE - NS ED ATTENDING STATEMENT MOD
This was a shared visit with the SANGITA. I reviewed and verified the documentation and independently performed the documented:

## 2022-04-11 NOTE — ED PROVIDER NOTE - PATIENT PORTAL LINK FT
You can access the FollowMyHealth Patient Portal offered by [facility] by registering at the following website: [email or web address]. By joining [facility]’s FollowMyHealth portal, you will also be able to view your health information using other applications (apps) compatible with our system.

## 2022-04-11 NOTE — ED ADULT NURSE NOTE - NSIMPLEMENTINTERV_GEN_ALL_ED
Implemented All Universal Safety Interventions:  [place] to call system. Call bell, personal items and telephone within reach. Instruct patient to call for assistance. Room bathroom lighting operational. Non-slip footwear when patient is off stretcher. Physically safe environment: no spills, clutter or unnecessary equipment. Stretcher in lowest position, wheels locked, appropriate side rails in place.

## 2022-04-11 NOTE — ED PROVIDER NOTE - OBJECTIVE STATEMENT
26 year old female with no known PMH presents to the ED complaining of vomiting since last night. Pt reports multiple episodes of non-bloody non-bilious vomiting. Denies associated abdominal pain, diarrhea, constipation, melena, hematochezia, fevers and chills, chest pain, dyspnea, leg pain or swelling. Daughter and  also with similar symptoms. Denies any other complaints.

## 2022-04-11 NOTE — ED PROVIDER NOTE - DISCHARGE REVIEW MATERIAL PRESENTED
Patient is alert and orientated x4, but can be confused at times. Patient sat O2 89%, placed on a non-rebreather. Sat O2 is now 97%. VSS, ELISA lung fine crackles, declines pain, left arm fistula + bruit and +thrill, and no edema in ELISA upper/lower extremities. Safety maintained. .

## 2022-04-11 NOTE — ED PROVIDER NOTE - CLINICAL SUMMARY MEDICAL DECISION MAKING FREE TEXT BOX
26 year old female with no known PMH presents to the ED complaining of vomiting since last night. HD stable. Abdomen non-acute. Imp: Vomiting, and sick contact, likely viral illness. Plan for labs, IV antiemetic, IV hydration, reassess.

## 2022-12-03 NOTE — ED ADULT NURSE NOTE - OBJECTIVE STATEMENT
allergic reaction
Patient presents to the ED with pain and swelling to the right knee after she was kicked during a soccer game.  AA&OX3, respirations unlabored, non-diaphoretic, no pallor.

## 2024-05-16 NOTE — ED ADULT NURSE NOTE - DOES PATIENT HAVE ADVANCE DIRECTIVE
"PRIMARY DIAGNOSIS: \"GENERIC\" NURSING  OUTPATIENT/OBSERVATION GOALS TO BE MET BEFORE DISCHARGE:  ADLs back to baseline: Yes    Activity and level of assistance: Ambulating independently.    Pain status: Improved-controlled with oral pain medications.    Return to near baseline physical activity: Yes     Discharge Planner Nurse   Safe discharge environment identified: Yes  Barriers to discharge: No       Entered by: Rema Cheng RN 05/16/2024 10:28 AM     Please review provider order for any additional goals.   Nurse to notify provider when observation goals have been met and patient is ready for discharge.    Pt is A&Ox4, reported 3/10 pain. Denies N/V/D, SOB, dizziness and N/T. Ambulated to bathroom.  " No